# Patient Record
Sex: MALE
[De-identification: names, ages, dates, MRNs, and addresses within clinical notes are randomized per-mention and may not be internally consistent; named-entity substitution may affect disease eponyms.]

---

## 2024-08-01 ENCOUNTER — NON-APPOINTMENT (OUTPATIENT)
Age: 74
End: 2024-08-01

## 2024-08-01 ENCOUNTER — APPOINTMENT (OUTPATIENT)
Dept: UROLOGY | Facility: CLINIC | Age: 74
End: 2024-08-01
Payer: COMMERCIAL

## 2024-08-01 VITALS
DIASTOLIC BLOOD PRESSURE: 76 MMHG | WEIGHT: 186 LBS | HEIGHT: 72 IN | BODY MASS INDEX: 25.19 KG/M2 | TEMPERATURE: 98 F | SYSTOLIC BLOOD PRESSURE: 143 MMHG | HEART RATE: 69 BPM

## 2024-08-01 PROBLEM — Z00.00 ENCOUNTER FOR PREVENTIVE HEALTH EXAMINATION: Status: ACTIVE | Noted: 2024-08-01

## 2024-08-01 PROCEDURE — 99204 OFFICE O/P NEW MOD 45 MIN: CPT

## 2024-08-01 PROCEDURE — G2211 COMPLEX E/M VISIT ADD ON: CPT | Mod: NC

## 2024-08-01 NOTE — PHYSICAL EXAM
[Normal Appearance] : normal appearance [Well Groomed] : well groomed [General Appearance - In No Acute Distress] : no acute distress [Respiration, Rhythm And Depth] : normal respiratory rhythm and effort [Exaggerated Use Of Accessory Muscles For Inspiration] : no accessory muscle use [Urethral Meatus] : meatus normal [Penis Abnormality] : normal circumcised penis [Scrotum] : the scrotum was normal [Testes Tenderness] : no tenderness of the testes [Testes Mass (___cm)] : there were no testicular masses [Normal Station and Gait] : the gait and station were normal for the patient's age [] : no rash [No Focal Deficits] : no focal deficits [Oriented To Time, Place, And Person] : oriented to person, place, and time [Affect] : the affect was normal [Mood] : the mood was normal [de-identified] : B/L 30cc testes, palpable plaque on mid right-distal shaft

## 2024-08-01 NOTE — END OF VISIT
[FreeTextEntry3] :  I, Dr. Nava, personally performed the evaluation and management (E/M) services for this new patient.  That E/M includes conducting the clinically appropriate initial history &/or exam, assessing all conditions, and establishing the plan of care.  Today, my BOLIVAR, [email or web address], was here to observe my evaluation and management service for this patient & follow plan of care established by me going forward.

## 2024-08-01 NOTE — ASSESSMENT
[FreeTextEntry1] : 75yo M peyronie's reviewed optoins We had a long conversation regarding treatment options in chronic Peyronie's disease. Treatments, including observation, the role of Xiaflex, penile plication surgery, plaque incision and grafting surgery, and insertion of inflatable penile prosthesis, were discussed at length.  The risks and benefits of Xiaflex were discussed. These include, significant hematoma and a small risk of penile fracture. The perceived and reported benefits of this treatment were discussed at length as well. The fact that he needs to abstain from sexual activity after treatment was discussed as was the length of the treatment regimen. In addition he understands that he will need to use a penile traction device and we spoke about the role of penile modeling. Literature fully delineating what to expect from this treatment was provided to the patient.   In addition, the risks of surgical treatment were discussed. With penile plication, he understands that shortening of the penis may be perceived. In addition, palpable knots from the sutures placed under the skin may be present. Possible risk of injury to the urethra and to other penile structures was discussed at length as well. Decreased penile sensitivity has also been reported. With plaque incision and grafting, the risk of long-standing erectile dysfunction in the postoperative period was discussed. Glans numbness and decreased penile sensitivity as a risk of the procedure was discussed as well.  Finally, the role of inflatable penile prosthesis as a treatment for both Peyronie's disease and erectile dysfunction was discussed. The 1-2% risk of device infection, device malfunction rates, risks of injury to contiguous organs including the urethra, risk of phallic shortening as well as the possiblity that additional intraoperative adjunctive maneuvers would be necessary (including modeling and plication) to get him to a straight phallus was also discussed. -recommending doppler and potential Xiaflex treatments -Pt will F/U with home urologist for treatment f/u here PRN

## 2024-08-01 NOTE — HISTORY OF PRESENT ILLNESS
[FreeTextEntry1] : LAITH PADILLA is a 74 year M presenting on 08/01/2024 PMH: peyronies, BPH, possible depuyten's contracture Referred by: Dr Pierson Lives in [place]  Peyronies for approx 3 years. Some discomfort with intercourse. Able to get fully rigid. Describes Right approximate 30* curve. Has not noted subdermal plaque. Interested in extra-tunical grafting procedure. Believes this procedure has least number side-effects.  BPH: On tamsulosin 0.4mg and tadalafil 5mg 3 previous incidences of urinary retention.  Has upcoming appt in [place] with urologist. And sees Dr in [place].  Has done 2 CTs of prostate. No suspicion of cancer.